# Patient Record
(demographics unavailable — no encounter records)

---

## 2024-12-01 NOTE — HISTORY OF PRESENT ILLNESS
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   PMH: Meds: Allergies: Surgeries: FHx: Social Hx: family, work, substance     Weight/Diet History:   Pre-surgical (VSG, 2013) weight 320 lbs, post-surgical lilian 200 lbs.  Pre-surgical (BPD-DS, 2020) weight 263 lbs, post-surgical lilian 175 lbs,    Weight today:   Diet: B (): L (): D (): Dessert: Snack: Night-time eating: Beverages: Binging: Fast-food/Restaurants: Cook/Prepare meals: Added oils: Food Journal:   Exercise:   Sleep:   Social Hx:   LABS? incl micrnut deficiency screening? Qsymia? papRy, etc Hx A1c >6.5% prior to 2013, or 2020??